# Patient Record
Sex: MALE | Race: BLACK OR AFRICAN AMERICAN | Employment: STUDENT | URBAN - METROPOLITAN AREA
[De-identification: names, ages, dates, MRNs, and addresses within clinical notes are randomized per-mention and may not be internally consistent; named-entity substitution may affect disease eponyms.]

---

## 2020-08-27 NOTE — TELEPHONE ENCOUNTER
RECORDS RECEIVED FROM: N/A    DATE RECEIVED: 9/4/2020   NOTES STATUS DETAILS   OFFICE NOTE from referring provider N/A    OFFICE NOTE from other specialist Care Everywhere/ In process [facility] ():  - 5/4/1010 Telephone Encounter with Dr. Mena Santana   - 5/1/2020 Office visit with Dr. Mena Santana   - 1/27/2020 Office visit with Dr. Sidney Jimenez      [facility]:  - 5/9/19 Office visit with HUOG Townsend CNP   - 4/26/19 Office visit with GENA Conryo CNP  - 1/15/19 Office visit with ERIKA Jean Baptiste    [facility]:  - 11/15/18 Office visit with GENA Guerrero CNP    [facility]:  - 11/17/18 Office visit with Dr. Chava Muñoz    [facility]:  - 7/26/18, 3/15/18 Office visit with Dr.Nasreen Turcios   - 4/25/18 Office visit with Dr. Jessica Velasco     DISCHARGE SUMMARY from hospital Care Everywhere/ Internal  8/29/2020 ([facility])     7/21/18, 7/19/18 ([facility])    OPERATIVE REPORT Care Everywhere    MEDICATION LIST N/A         ENDOSCOPY  Care Everywhere EGD: 5/15/19 ([facility])    COLONOSCOPY N/A    ERCP N/A    EUS N/A    STOOL TESTING N/A    PERTINENT LABS Care Everywhere    PATHOLOGY REPORTS (RELATED) N/A    IMAGING (CT, MRI, EGD) N/A      REFERRAL INFORMATION    Date referral was placed: 9/4/2020   Date all records received:    Date records were scanned into EPIC:    Date records were sent to Provider to review:    Date and recommendation received from provider:  LETTER SENT  SCHEDULE APPOINTMENT   Date patient was contacted to schedule: 8/26/2020 8/27/2020 9:56am Fax request sent to [facility] for med recs. -ao    8/27/2020 10:45pm Received a fax reply from [employer] they are unable to find Pt in their system. CSS will have to connect with Pt regarding med recs. -ao     8/31/2020 10:03am Called and LVM for Pt. -ao     9/1/2020 2:19pm Called and LVM for Pt. -Bhao      9/3/2020 1:39pm Called and LVM for Pt. CSS will notify provider that Pt may have some recs with [facility] per noted in Appt Notes from Pt's mom. Closing encounter now. Lino

## 2020-08-29 ENCOUNTER — APPOINTMENT (OUTPATIENT)
Dept: CT IMAGING | Facility: CLINIC | Age: 20
End: 2020-08-29
Attending: EMERGENCY MEDICINE
Payer: COMMERCIAL

## 2020-08-29 ENCOUNTER — HOSPITAL ENCOUNTER (EMERGENCY)
Facility: CLINIC | Age: 20
Discharge: HOME | End: 2020-08-29
Attending: EMERGENCY MEDICINE | Admitting: EMERGENCY MEDICINE
Payer: COMMERCIAL

## 2020-08-29 VITALS
WEIGHT: 135.4 LBS | HEIGHT: 68 IN | OXYGEN SATURATION: 100 % | BODY MASS INDEX: 20.52 KG/M2 | RESPIRATION RATE: 18 BRPM | DIASTOLIC BLOOD PRESSURE: 88 MMHG | HEART RATE: 64 BPM | TEMPERATURE: 97.8 F | SYSTOLIC BLOOD PRESSURE: 129 MMHG

## 2020-08-29 DIAGNOSIS — R11.2 NON-INTRACTABLE VOMITING WITH NAUSEA, UNSPECIFIED VOMITING TYPE: ICD-10-CM

## 2020-08-29 LAB
ALBUMIN SERPL-MCNC: 4 G/DL (ref 3.4–5)
ALP SERPL-CCNC: 48 U/L (ref 65–260)
ALT SERPL W P-5'-P-CCNC: 17 U/L (ref 0–50)
AMPHETAMINES UR QL SCN: NEGATIVE
ANION GAP SERPL CALCULATED.3IONS-SCNC: 6 MMOL/L (ref 3–14)
AST SERPL W P-5'-P-CCNC: 10 U/L (ref 0–35)
BARBITURATES UR QL: NEGATIVE
BASOPHILS # BLD AUTO: 0 10E9/L (ref 0–0.2)
BASOPHILS NFR BLD AUTO: 0.7 %
BENZODIAZ UR QL: NEGATIVE
BILIRUB SERPL-MCNC: 0.9 MG/DL (ref 0.2–1.3)
BUN SERPL-MCNC: 6 MG/DL (ref 7–30)
CALCIUM SERPL-MCNC: 9.3 MG/DL (ref 8.5–10.1)
CANNABINOIDS UR QL SCN: POSITIVE
CHLORIDE SERPL-SCNC: 107 MMOL/L (ref 98–110)
CO2 SERPL-SCNC: 26 MMOL/L (ref 20–32)
COCAINE UR QL: NEGATIVE
CREAT SERPL-MCNC: 0.82 MG/DL (ref 0.5–1)
DIFFERENTIAL METHOD BLD: NORMAL
EOSINOPHIL # BLD AUTO: 0 10E9/L (ref 0–0.7)
EOSINOPHIL NFR BLD AUTO: 0.2 %
ERYTHROCYTE [DISTWIDTH] IN BLOOD BY AUTOMATED COUNT: 13.3 % (ref 10–15)
ETHANOL UR QL SCN: NEGATIVE
GFR SERPL CREATININE-BSD FRML MDRD: >90 ML/MIN/{1.73_M2}
GLUCOSE SERPL-MCNC: 106 MG/DL (ref 70–99)
HCT VFR BLD AUTO: 43.2 % (ref 40–53)
HGB BLD-MCNC: 13.8 G/DL (ref 13.3–17.7)
IMM GRANULOCYTES # BLD: 0 10E9/L (ref 0–0.4)
IMM GRANULOCYTES NFR BLD: 0.2 %
LIPASE SERPL-CCNC: 121 U/L (ref 73–393)
LYMPHOCYTES # BLD AUTO: 1.8 10E9/L (ref 0.8–5.3)
LYMPHOCYTES NFR BLD AUTO: 32.1 %
MCH RBC QN AUTO: 27.6 PG (ref 26.5–33)
MCHC RBC AUTO-ENTMCNC: 31.9 G/DL (ref 31.5–36.5)
MCV RBC AUTO: 86 FL (ref 78–100)
MONOCYTES # BLD AUTO: 0.4 10E9/L (ref 0–1.3)
MONOCYTES NFR BLD AUTO: 7.7 %
NEUTROPHILS # BLD AUTO: 3.3 10E9/L (ref 1.6–8.3)
NEUTROPHILS NFR BLD AUTO: 59.1 %
NRBC # BLD AUTO: 0 10*3/UL
NRBC BLD AUTO-RTO: 0 /100
OPIATES UR QL SCN: NEGATIVE
PLATELET # BLD AUTO: 238 10E9/L (ref 150–450)
POTASSIUM SERPL-SCNC: 3.7 MMOL/L (ref 3.4–5.3)
PROT SERPL-MCNC: 7.2 G/DL (ref 6.8–8.8)
RBC # BLD AUTO: 5 10E12/L (ref 4.4–5.9)
SODIUM SERPL-SCNC: 139 MMOL/L (ref 133–144)
WBC # BLD AUTO: 5.6 10E9/L (ref 4–11)

## 2020-08-29 PROCEDURE — 96375 TX/PRO/DX INJ NEW DRUG ADDON: CPT | Performed by: EMERGENCY MEDICINE

## 2020-08-29 PROCEDURE — 85025 COMPLETE CBC W/AUTO DIFF WBC: CPT | Performed by: EMERGENCY MEDICINE

## 2020-08-29 PROCEDURE — 96374 THER/PROPH/DIAG INJ IV PUSH: CPT | Mod: 59 | Performed by: EMERGENCY MEDICINE

## 2020-08-29 PROCEDURE — C9113 INJ PANTOPRAZOLE SODIUM, VIA: HCPCS | Performed by: EMERGENCY MEDICINE

## 2020-08-29 PROCEDURE — 25000132 ZZH RX MED GY IP 250 OP 250 PS 637: Performed by: EMERGENCY MEDICINE

## 2020-08-29 PROCEDURE — 81001 URINALYSIS AUTO W/SCOPE: CPT | Mod: XU | Performed by: EMERGENCY MEDICINE

## 2020-08-29 PROCEDURE — 25000128 H RX IP 250 OP 636: Performed by: EMERGENCY MEDICINE

## 2020-08-29 PROCEDURE — 25000125 ZZHC RX 250: Performed by: EMERGENCY MEDICINE

## 2020-08-29 PROCEDURE — 25800030 ZZH RX IP 258 OP 636: Performed by: EMERGENCY MEDICINE

## 2020-08-29 PROCEDURE — 99285 EMERGENCY DEPT VISIT HI MDM: CPT | Mod: 25 | Performed by: EMERGENCY MEDICINE

## 2020-08-29 PROCEDURE — 74177 CT ABD & PELVIS W/CONTRAST: CPT

## 2020-08-29 PROCEDURE — 83690 ASSAY OF LIPASE: CPT | Performed by: EMERGENCY MEDICINE

## 2020-08-29 PROCEDURE — 80053 COMPREHEN METABOLIC PANEL: CPT | Performed by: EMERGENCY MEDICINE

## 2020-08-29 PROCEDURE — 99284 EMERGENCY DEPT VISIT MOD MDM: CPT | Mod: Z6 | Performed by: EMERGENCY MEDICINE

## 2020-08-29 PROCEDURE — 80307 DRUG TEST PRSMV CHEM ANLYZR: CPT | Performed by: EMERGENCY MEDICINE

## 2020-08-29 PROCEDURE — 96361 HYDRATE IV INFUSION ADD-ON: CPT | Performed by: EMERGENCY MEDICINE

## 2020-08-29 PROCEDURE — 80320 DRUG SCREEN QUANTALCOHOLS: CPT | Performed by: EMERGENCY MEDICINE

## 2020-08-29 PROCEDURE — 25000128 H RX IP 250 OP 636: Performed by: STUDENT IN AN ORGANIZED HEALTH CARE EDUCATION/TRAINING PROGRAM

## 2020-08-29 RX ORDER — IOPAMIDOL 755 MG/ML
82 INJECTION, SOLUTION INTRAVASCULAR ONCE
Status: COMPLETED | OUTPATIENT
Start: 2020-08-29 | End: 2020-08-29

## 2020-08-29 RX ORDER — ONDANSETRON 4 MG/1
4 TABLET, ORALLY DISINTEGRATING ORAL EVERY 8 HOURS PRN
Qty: 30 TABLET | Refills: 0 | Status: SHIPPED | OUTPATIENT
Start: 2020-08-29

## 2020-08-29 RX ORDER — ONDANSETRON 2 MG/ML
4 INJECTION INTRAMUSCULAR; INTRAVENOUS ONCE
Status: COMPLETED | OUTPATIENT
Start: 2020-08-29 | End: 2020-08-29

## 2020-08-29 RX ADMIN — ONDANSETRON 4 MG: 2 INJECTION INTRAMUSCULAR; INTRAVENOUS at 21:30

## 2020-08-29 RX ADMIN — LIDOCAINE HYDROCHLORIDE 30 ML: 20 SOLUTION ORAL; TOPICAL at 22:20

## 2020-08-29 RX ADMIN — PANTOPRAZOLE SODIUM 40 MG: 40 INJECTION, POWDER, FOR SOLUTION INTRAVENOUS at 22:20

## 2020-08-29 RX ADMIN — IOPAMIDOL 82 ML: 755 INJECTION, SOLUTION INTRAVENOUS at 21:54

## 2020-08-29 RX ADMIN — SODIUM CHLORIDE, POTASSIUM CHLORIDE, SODIUM LACTATE AND CALCIUM CHLORIDE 1000 ML: 600; 310; 30; 20 INJECTION, SOLUTION INTRAVENOUS at 21:30

## 2020-08-29 ASSESSMENT — ENCOUNTER SYMPTOMS
ABDOMINAL DISTENTION: 1
DYSURIA: 0
DIFFICULTY URINATING: 0
CONSTIPATION: 0
UNEXPECTED WEIGHT CHANGE: 1
ABDOMINAL PAIN: 1
APPETITE CHANGE: 1
DIARRHEA: 0
VOMITING: 1

## 2020-08-29 ASSESSMENT — MIFFLIN-ST. JEOR: SCORE: 1603.67

## 2020-08-29 NOTE — ED AVS SNAPSHOT
[facility], [place], Emergency Department  [address] [address]  Phone:  [phone]                                    Bryan Burgess   MRN: [id number]    Department:  [facility], Emergency Department   Date of Visit:  8/29/2020           After Visit Summary Signature Page    I have received my discharge instructions, and my questions have been answered. I have discussed any challenges I see with this plan with the nurse or doctor.    ..........................................................................................................................................  Patient/Patient Representative Signature      ..........................................................................................................................................  Patient Representative Print Name and Relationship to Patient    ..................................................               ................................................  Date                                   Time    ..........................................................................................................................................  Reviewed by Signature/Title    ...................................................              ..............................................  Date                                               Time          22EPIC Rev 08/18

## 2020-08-30 LAB
ALBUMIN UR-MCNC: NEGATIVE MG/DL
APPEARANCE UR: ABNORMAL
BILIRUB UR QL STRIP: NEGATIVE
COLOR UR AUTO: ABNORMAL
GLUCOSE UR STRIP-MCNC: NEGATIVE MG/DL
HGB UR QL STRIP: NEGATIVE
KETONES UR STRIP-MCNC: NEGATIVE MG/DL
LEUKOCYTE ESTERASE UR QL STRIP: NEGATIVE
NITRATE UR QL: NEGATIVE
PH UR STRIP: 8 PH (ref 5–7)
RBC #/AREA URNS AUTO: 0 /HPF (ref 0–2)
SOURCE: ABNORMAL
SP GR UR STRIP: 1 (ref 1–1.03)
SQUAMOUS #/AREA URNS AUTO: <1 /HPF (ref 0–1)
UROBILINOGEN UR STRIP-MCNC: NORMAL MG/DL (ref 0–2)
WBC #/AREA URNS AUTO: ABNORMAL /HPF (ref 0–5)

## 2020-08-30 NOTE — DISCHARGE INSTRUCTIONS
Thank you for coming to the [facility] Emergency Department.     Please follow up with primary care until you have your scheduled appointment with gastroenterology.     See the attached information about gastoparesis. I think this may be your diagnosis, but you will need more testing to confirm the diagnosis. Please try the diet changes listed.     You may use zofran every 8 hours as needed if nausea and vomiting occur.     If you have difficulty getting an appointment with gastroenterology at [facility], try contacting [facility]:  [email or web address]  Multiple locations; the closest is [address] [place] [address] [address] [place] [address] [phone]

## 2020-08-30 NOTE — ED TRIAGE NOTES
Onset of upper and mid abd pain with vomiting that the patient states has been on going for a year and had a endoscopy which was [facility].  Reports this flare up began this morning.  Pt has been able to tolerate liquids but hasn't tried food yet. No blood in the vomit. Pt states he ate yesterday his normal stuff and was fine.  Last normal BM was today.  Denies use of mariajuana.

## 2020-08-30 NOTE — ED PROVIDER NOTES
ED Provider Note  [facility]      History     Chief Complaint   Patient presents with     Abdominal Pain     Vomiting     The history is provided by the patient and medical records.     Bryan Burgess is a 19 year old male with a past medical history significant for abdominal pain and vomiting who presents here to the Emergency Department due to ongoing abdominal pain and vomiting.  Patient reports he has been experiencing these symptoms for a year.  He states he had an endoscopy at [facility] that was normal.  Patient notes that he has never received a diagnosis as to what causes his flare ups.  He states he will go through waves of throwing up every morning and when he throws up in the morning it is all bile. Notes that he has lost a lot of weight.  He notes the onset of the current flare up was this morning. He states he has been able to tolerate liquids, but has not yet tried food.  States he is usually unable to eat due to pain because he will vomit after.  He reports that eating causes a feeling of fullness and bloating.  Patient reports that he can not eat greasy or heavy foods. He denies any episodes of hematemesis.  Patient endorses diffuse abdominal pain currently.  He endorses regular bowel movements.  He states the abdominal pain will prevent him from working out.  He notes he takes zofran for nausea which helps somewhat, however, he has not had any for months.  Denies any mucus in his stool or loose stool.  Patient denies intentionally restricting food or inducing vomiting.  He denies current tobacco or marijuana use. Denies pain or changes to his scrotum. Patient denies pain or difficulty with urination.       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     Past Surgical History:   Procedure Laterality Date     NO HISTORY OF SURGERY       ondansetron (ZOFRAN ODT) 4 MG  "ODT tab      No Known Allergies  Family History  Family History   Problem Relation Age of Onset     Kidney Disease No family hx of      Hypertension No family hx of      Thyroid Disease No family hx of      Social History   Social History     Tobacco Use     Smoking status: Passive Smoke Exposure - Never Smoker   Substance Use Topics     Alcohol use: None     Drug use: None      Past medical history, past surgical history, medications, allergies, family history, and social history were reviewed with the patient. No additional pertinent items.       Review of Systems   Constitutional: Positive for appetite change (decreased) and unexpected weight change (weight loss).   Gastrointestinal: Positive for abdominal distention, abdominal pain and vomiting. Negative for constipation and diarrhea.   Genitourinary: Negative for difficulty urinating, dysuria, scrotal swelling and testicular pain.     A complete review of systems was performed with pertinent positives and negatives noted in the HPI, and all other systems negative.    Physical Exam   BP: 116/81  Pulse: 101  Temp: 97.8  F (36.6  C)  Resp: 18  Height: 172.7 cm (5' 8\")  Weight: 61.4 kg (135 lb 6.4 oz)  SpO2: 96 %  Physical Exam  Gen:A&Ox3, no acute distress  HEENT:PERRL, no facial tenderness or wounds, head atraumatic, oropharynx clear, mucous membranes moist, TMs clear bilaterally  Neck:no bony tenderness or step offs, no JVD, trachea midline  Back: no CVA tenderness, no midline bony tenderness  CV:RRR without murmurs  PULM:Clear to auscultation bilaterally  Abd:soft, minimal epigastric tenderness, negative mendez's sign, thin  UE:No traumatic injuries, skin normal  LE:no traumatic injuries, skin normal, no LE edema   Neuro:CN II-XII intact, strength 5/5 throughout  Skin: no rashes or ecchymoses     ED Course     8:36 PM  The patient was seen and examined by Salud Granados MD in Room ED27.    Procedures        Results for orders placed or performed during the " hospital encounter of 08/29/20   CT Abdomen Pelvis w Contrast     Status: None    Narrative    EXAMINATION: CT ABDOMEN PELVIS W CONTRAST, 8/29/2020 9:56 PM    TECHNIQUE:  Helical CT images from the lung bases through the  symphysis pubis were obtained with IV contrast. Contrast dose: 82 cc  Isovue-370 IV    COMPARISON: None available    HISTORY: upper abdominal pain and vomiting, weight loss    FINDINGS:    Abdomen and pelvis:     Unremarkable hepatic parenchyma. No calcified gallstones. No intra or  extrahepatic biliary dilatation. Unremarkable pancreas, spleen, and  adrenal glands. Symmetric nephrographic enhancement, without  hydronephrosis. The urinary bladder is partially distended and  otherwise unremarkable in appearance. Unremarkable prostate.    No abnormally distended loops of small or large bowel. Unremarkable  appendix in the right lower quadrant. No intraperitoneal free fluid or  free air. No pneumatosis or portal venous gas. Major abdominal  vasculature is patent, without evidence of infrarenal abdominal aortic  aneurysm. No suspicious lymphadenopathy in the abdomen or pelvis.    Lung bases: The visualized lung bases are clear, without pleural  effusion. The heart is unremarkable.    Bones and soft tissues: No acute or aggressive osseous lesion. No  significant degenerative change.      Impression    IMPRESSION: No acute findings within the abdomen or pelvis to explain  the patient's symptoms of abdominal pain.     I have personally reviewed the examination and initial interpretation  and I agree with the findings.    CARO RESENDEZ MD   CBC with platelets differential     Status: None   Result Value Ref Range    WBC 5.6 4.0 - 11.0 10e9/L    RBC Count 5.00 4.4 - 5.9 10e12/L    Hemoglobin 13.8 13.3 - 17.7 g/dL    Hematocrit 43.2 40.0 - 53.0 %    MCV 86 78 - 100 fl    MCH 27.6 26.5 - 33.0 pg    MCHC 31.9 31.5 - 36.5 g/dL    RDW 13.3 10.0 - 15.0 %    Platelet Count 238 150 - 450 10e9/L    Diff Method  Automated Method     % Neutrophils 59.1 %    % Lymphocytes 32.1 %    % Monocytes 7.7 %    % Eosinophils 0.2 %    % Basophils 0.7 %    % Immature Granulocytes 0.2 %    Nucleated RBCs 0 0 /100    Absolute Neutrophil 3.3 1.6 - 8.3 10e9/L    Absolute Lymphocytes 1.8 0.8 - 5.3 10e9/L    Absolute Monocytes 0.4 0.0 - 1.3 10e9/L    Absolute Eosinophils 0.0 0.0 - 0.7 10e9/L    Absolute Basophils 0.0 0.0 - 0.2 10e9/L    Abs Immature Granulocytes 0.0 0 - 0.4 10e9/L    Absolute Nucleated RBC 0.0    Comprehensive metabolic panel     Status: Abnormal   Result Value Ref Range    Sodium 139 133 - 144 mmol/L    Potassium 3.7 3.4 - 5.3 mmol/L    Chloride 107 98 - 110 mmol/L    Carbon Dioxide 26 20 - 32 mmol/L    Anion Gap 6 3 - 14 mmol/L    Glucose 106 (H) 70 - 99 mg/dL    Urea Nitrogen 6 (L) 7 - 30 mg/dL    Creatinine 0.82 0.50 - 1.00 mg/dL    GFR Estimate >90 >60 mL/min/[1.73_m2]    GFR Estimate If Black >90 >60 mL/min/[1.73_m2]    Calcium 9.3 8.5 - 10.1 mg/dL    Bilirubin Total 0.9 0.2 - 1.3 mg/dL    Albumin 4.0 3.4 - 5.0 g/dL    Protein Total 7.2 6.8 - 8.8 g/dL    Alkaline Phosphatase 48 (L) 65 - 260 U/L    ALT 17 0 - 50 U/L    AST 10 0 - 35 U/L   Lipase     Status: None   Result Value Ref Range    Lipase 121 73 - 393 U/L   UA with Microscopic reflex to Culture     Status: Abnormal    Specimen: Midstream Urine   Result Value Ref Range    Color Urine Light Yellow     Appearance Urine Slightly Cloudy     Glucose Urine Negative NEG^Negative mg/dL    Bilirubin Urine Negative NEG^Negative    Ketones Urine Negative NEG^Negative mg/dL    Specific Gravity Urine 1.005 1.003 - 1.035    Blood Urine Negative NEG^Negative    pH Urine 8.0 (H) 5.0 - 7.0 pH    Protein Albumin Urine Negative NEG^Negative mg/dL    Urobilinogen mg/dL Normal 0.0 - 2.0 mg/dL    Nitrite Urine Negative NEG^Negative    Leukocyte Esterase Urine Negative NEG^Negative    Source Midstream Urine     WBC Urine None 0 - 5 /HPF    RBC Urine 0 0 - 2 /HPF    Squamous Epithelial  /HPF Urine <1 0 - 1 /HPF   Drug abuse screen 6 urine (tox)     Status: Abnormal   Result Value Ref Range    Amphetamine Qual Urine Negative NEG^Negative    Barbiturates Qual Urine Negative NEG^Negative    Benzodiazepine Qual Urine Negative NEG^Negative    Cannabinoids Qual Urine Positive (A) NEG^Negative    Cocaine Qual Urine Negative NEG^Negative    Ethanol Qual Urine Negative NEG^Negative    Opiates Qualitative Urine Negative NEG^Negative     Medications   ondansetron (ZOFRAN) injection 4 mg (4 mg Intravenous Given 8/29/20 2130)   lactated ringers BOLUS 1,000 mL (0 mLs Intravenous Stopped 8/29/20 2233)   iopamidol (ISOVUE-370) solution 82 mL (82 mLs Intravenous Given 8/29/20 2154)   sodium chloride (PF) 0.9% PF flush 71 mL (71 mLs Intravenous Given 8/29/20 2154)   lidocaine (XYLOCAINE) 2 % 15 mL, alum & mag hydroxide-simethicone (MAALOX  ES) 15 mL GI Cocktail (30 mLs Oral Given 8/29/20 2220)   pantoprazole (PROTONIX) 40 mg IV push injection (40 mg Intravenous Given 8/29/20 2220)        Assessments & Plan (with Medical Decision Making)   20 yo M presenting with recurrent upper abdominal pain with persistent nausea and frequent vomiting. Losing weight, or having difficulty maintaining weight.   Previous negative upper endoscopy.   No other previous imaging.  DDx includes gastroparesis, undiagnosed structural abnormalities like malrotation, IBS, PUD or gastritis.   Pt denies cannabinoid use to suggest hyperemesis syndrome.   IV access obtained and lab testing done.   CBC unremarkable.   CMP with normal creatinine and electrolytes.  Lipase normal.   UA without UTI or hematuria. Urine drug screen + for cannabinoids. Raising concern for possible contribution of cannabinoid hyperemesis, though pt denied use.   CT A/P shows no intraabdominal abnormalities.   Treated with zofran, GI cocktail and protonix with improvement in symptoms.   DDx still includes gastroparesis. Referred to gastroenterology for further evaluation  and consideration for gastric emptying study.   Recommended a trial dietary changes for gastroparesis.   Discharged. Zofran PRN for any return of nausea.     I have reviewed the nursing notes. I have reviewed the findings, diagnosis, plan and need for follow up with the patient.    Discharge Medication List as of 8/29/2020 11:06 PM      START taking these medications    Details   ondansetron (ZOFRAN ODT) 4 MG ODT tab Take 1 tablet (4 mg) by mouth every 8 hours as needed for nausea or vomiting, Disp-30 tablet,R-0, Local Print             Final diagnoses:   Non-intractable vomiting with nausea, unspecified vomiting type   I, Sharron Anne, am serving as a trained medical scribe to document services personally performed by Salud Granados MD, based on the provider's statements to me.     I, Salud Granados MD, was physically present and have reviewed and verified the accuracy of this note documented by Sharron Anne.     --  Salud Granados MD FACEP  [facility], [place], EMERGENCY DEPARTMENT  8/29/2020     Salud Granados MD  08/30/20 0248

## 2020-09-04 ENCOUNTER — PRE VISIT (OUTPATIENT)
Dept: GASTROENTEROLOGY | Facility: CLINIC | Age: 20
End: 2020-09-04

## 2020-09-04 PROBLEM — R63.4 ABNORMAL WEIGHT LOSS: Status: ACTIVE | Noted: 2020-01-27

## 2020-09-04 PROBLEM — M54.50 CHRONIC BILATERAL LOW BACK PAIN WITHOUT SCIATICA: Status: ACTIVE | Noted: 2017-11-22

## 2020-09-04 PROBLEM — G89.29 CHRONIC BILATERAL LOW BACK PAIN WITHOUT SCIATICA: Status: ACTIVE | Noted: 2017-11-22
